# Patient Record
Sex: FEMALE | Race: WHITE | NOT HISPANIC OR LATINO | Employment: OTHER | URBAN - NONMETROPOLITAN AREA
[De-identification: names, ages, dates, MRNs, and addresses within clinical notes are randomized per-mention and may not be internally consistent; named-entity substitution may affect disease eponyms.]

---

## 2019-04-08 ENCOUNTER — OFFICE VISIT (OUTPATIENT)
Dept: SURGERY | Facility: CLINIC | Age: 69
End: 2019-04-08

## 2019-04-08 VITALS
TEMPERATURE: 97.7 F | WEIGHT: 186.8 LBS | OXYGEN SATURATION: 100 % | DIASTOLIC BLOOD PRESSURE: 77 MMHG | SYSTOLIC BLOOD PRESSURE: 124 MMHG | HEIGHT: 63 IN | BODY MASS INDEX: 33.1 KG/M2 | HEART RATE: 96 BPM

## 2019-04-08 DIAGNOSIS — C20 RECTAL CANCER METASTASIZED TO LIVER (HCC): Primary | ICD-10-CM

## 2019-04-08 DIAGNOSIS — C78.7 RECTAL CANCER METASTASIZED TO LIVER (HCC): Primary | ICD-10-CM

## 2019-04-08 PROCEDURE — 99203 OFFICE O/P NEW LOW 30 MIN: CPT | Performed by: SURGERY

## 2019-04-08 RX ORDER — GEMFIBROZIL 600 MG/1
TABLET, FILM COATED ORAL
COMMUNITY
Start: 2019-02-15 | End: 2019-04-08

## 2019-04-08 RX ORDER — GLYBURIDE 5 MG/1
5 TABLET ORAL
COMMUNITY
Start: 2019-02-22

## 2019-04-08 RX ORDER — DRONABINOL 5 MG/1
CAPSULE ORAL
COMMUNITY
Start: 2019-04-05 | End: 2019-04-08

## 2019-04-08 RX ORDER — PRAVASTATIN SODIUM 80 MG/1
80 TABLET ORAL NIGHTLY
COMMUNITY
Start: 2019-02-15

## 2019-04-08 RX ORDER — PROMETHAZINE HYDROCHLORIDE 25 MG/1
25 TABLET ORAL EVERY 6 HOURS PRN
COMMUNITY
Start: 2019-02-22

## 2019-04-08 RX ORDER — LISINOPRIL AND HYDROCHLOROTHIAZIDE 25; 20 MG/1; MG/1
1 TABLET ORAL DAILY
COMMUNITY
Start: 2019-02-26

## 2019-04-08 NOTE — PROGRESS NOTES
Patient: Isa Beauchamp    YOB: 1950    Date: 04/08/2019    Primary Care Provider: Juni Graham DO    Chief Complaint   Patient presents with   • Colon Cancer       SUBJECTIVE:    History of present illness: Patient with a new diagnosis of metastatic rectal cancer.  This was diagnosed about 3-4 weeks ago.  She was having lower abdominal pressure and ultimately had a colonoscopy and was found to have this.  She states that she is quite weak and tired since she is unable to eat much of anything.  She has no appetite.  She has been referred for a port placement.    The following portions of the patient's history were reviewed and updated as appropriate: allergies, current medications, past family history, past medical history, past social history, past surgical history and problem list.       Review of Systems   Constitutional: Negative for chills, fever and unexpected weight change.   HENT: Negative for hearing loss, trouble swallowing and voice change.    Eyes: Negative for visual disturbance.   Respiratory: Negative for apnea, cough, chest tightness, shortness of breath and wheezing.    Cardiovascular: Negative for chest pain, palpitations and leg swelling.   Gastrointestinal: Negative for abdominal distention, abdominal pain, anal bleeding, blood in stool, constipation, diarrhea, nausea, rectal pain and vomiting.   Endocrine: Negative for cold intolerance and heat intolerance.   Genitourinary: Negative for difficulty urinating, dysuria and flank pain.   Musculoskeletal: Negative for back pain and gait problem.   Skin: Negative for color change, rash and wound.   Neurological: Negative for dizziness, syncope, speech difficulty, weakness, light-headedness, numbness and headaches.   Hematological: Negative for adenopathy. Does not bruise/bleed easily.   Psychiatric/Behavioral: Negative for confusion. The patient is not nervous/anxious.        Allergies:  No Known  "Allergies    Medications:    Current Outpatient Medications:   •  glyBURIDE (DIAbeta) 5 MG tablet, , Disp: , Rfl:   •  lisinopril-hydrochlorothiazide (PRINZIDE,ZESTORETIC) 20-25 MG per tablet, , Disp: , Rfl:   •  metFORMIN (GLUCOPHAGE) 1000 MG tablet, , Disp: , Rfl:   •  pravastatin (PRAVACHOL) 80 MG tablet, , Disp: , Rfl:   •  promethazine (PHENERGAN) 25 MG tablet, , Disp: , Rfl:     History:  Past Medical History:   Diagnosis Date   • Cancer (CMS/HCC)    • Diabetes mellitus (CMS/HCC)    • Hypertension        Past Surgical History:   Procedure Laterality Date   • CATARACT EXTRACTION     • CHOLECYSTECTOMY     • KNEE ARTHROPLASTY, PARTIAL REPLACEMENT     • KNEE SURGERY         Family History   Problem Relation Age of Onset   • Cancer Brother        Social History     Tobacco Use   • Smoking status: Never Smoker   • Smokeless tobacco: Never Used   Substance Use Topics   • Alcohol use: No     Frequency: Never   • Drug use: No        OBJECTIVE:    Vital Signs:   Vitals:    04/08/19 1328   BP: 124/77   Pulse: 96   Temp: 97.7 °F (36.5 °C)   SpO2: 100%   Weight: 84.7 kg (186 lb 12.8 oz)   Height: 160 cm (63\")       Physical Exam:   General Appearance:   Alert and oriented.  She does appears somewhat weak.   Head:    Normocephalic, without obvious abnormality, atraumatic   Eyes:            Normal.  No scleral icterus.  PERRLA    Lungs:     Clear to auscultation,respirations regular, even and                  unlabored    Heart:    Regular rhythm and normal rate, normal S1 and S2, no            murmur   Abdomen:     Normal bowel sounds, no masses, no organomegaly, soft        non-tender, non-distended, no guarding,    Extremities:   Moves all extremities well, no edema, no cyanosis, no             redness   Skin:   No bleeding, bruising or rash   Neurologic:   Normal without gross deficits.   Psychiatric: No evidence of depression or anxiety        Results Review:   None reviewed her oncology note    ASSESSMENT/PLAN:    1. " Rectal cancer metastasized to liver (CMS/HCC)        Patient requiring a port for chemotherapy.  She understands the procedure.  She also understands the risks of bleeding, infection as well as pneumothorax and she understands the ramifications of these potential complications and she wishes to proceed.    Electronically signed by Joe Collins MD  04/08/19      .

## 2019-04-09 PROBLEM — C78.7 RECTAL CANCER METASTASIZED TO LIVER (HCC): Status: ACTIVE | Noted: 2019-04-09

## 2019-04-09 PROBLEM — C20 RECTAL CANCER METASTASIZED TO LIVER (HCC): Status: ACTIVE | Noted: 2019-04-09

## 2019-04-09 RX ORDER — GEMFIBROZIL 600 MG/1
600 TABLET, FILM COATED ORAL
COMMUNITY

## 2019-04-09 RX ORDER — ACETAMINOPHEN 500 MG
500 TABLET ORAL EVERY 6 HOURS PRN
COMMUNITY

## 2019-04-10 ENCOUNTER — ANESTHESIA EVENT (OUTPATIENT)
Dept: PERIOP | Facility: HOSPITAL | Age: 69
End: 2019-04-10

## 2019-04-10 ENCOUNTER — HOSPITAL ENCOUNTER (OUTPATIENT)
Facility: HOSPITAL | Age: 69
Setting detail: HOSPITAL OUTPATIENT SURGERY
Discharge: HOME | End: 2019-04-10
Attending: SURGERY | Admitting: SURGERY

## 2019-04-10 ENCOUNTER — APPOINTMENT (OUTPATIENT)
Dept: GENERAL RADIOLOGY | Facility: HOSPITAL | Age: 69
End: 2019-04-10

## 2019-04-10 ENCOUNTER — ANESTHESIA (OUTPATIENT)
Dept: PERIOP | Facility: HOSPITAL | Age: 69
End: 2019-04-10

## 2019-04-10 VITALS
TEMPERATURE: 97.2 F | HEIGHT: 64 IN | OXYGEN SATURATION: 93 % | SYSTOLIC BLOOD PRESSURE: 113 MMHG | RESPIRATION RATE: 18 BRPM | BODY MASS INDEX: 31.76 KG/M2 | DIASTOLIC BLOOD PRESSURE: 61 MMHG | HEART RATE: 67 BPM | WEIGHT: 186 LBS

## 2019-04-10 DIAGNOSIS — C20 RECTAL CANCER METASTASIZED TO LIVER (HCC): ICD-10-CM

## 2019-04-10 DIAGNOSIS — C78.7 RECTAL CANCER METASTASIZED TO LIVER (HCC): ICD-10-CM

## 2019-04-10 LAB — GLUCOSE BLDC GLUCOMTR-MCNC: 129 MG/DL (ref 70–130)

## 2019-04-10 PROCEDURE — C1788 PORT, INDWELLING, IMP: HCPCS | Performed by: SURGERY

## 2019-04-10 PROCEDURE — 76000 FLUOROSCOPY <1 HR PHYS/QHP: CPT

## 2019-04-10 PROCEDURE — 71045 X-RAY EXAM CHEST 1 VIEW: CPT

## 2019-04-10 PROCEDURE — 25010000002 HEPARIN (PORCINE) PER 1000 UNITS: Performed by: SURGERY

## 2019-04-10 PROCEDURE — 25010000002 PROPOFOL 10 MG/ML EMULSION: Performed by: NURSE ANESTHETIST, CERTIFIED REGISTERED

## 2019-04-10 PROCEDURE — 25010000002 PROPOFOL 1000 MG/ML EMULSION: Performed by: NURSE ANESTHETIST, CERTIFIED REGISTERED

## 2019-04-10 PROCEDURE — 82962 GLUCOSE BLOOD TEST: CPT

## 2019-04-10 PROCEDURE — 36561 INSERT TUNNELED CV CATH: CPT | Performed by: SURGERY

## 2019-04-10 PROCEDURE — 25010000003 CEFAZOLIN PER 500 MG: Performed by: SURGERY

## 2019-04-10 DEVICE — POWERPORT CLEARVUE IMPLANTABLE PORT WITH ATTACHABLE 8F POLYURETHANE OPEN-ENDED SINGLE-LUMEN VENOUS CATHETER INTERMEDIATE KIT
Type: IMPLANTABLE DEVICE | Status: FUNCTIONAL
Brand: POWERPORT CLEARVUE

## 2019-04-10 RX ORDER — BUPIVACAINE HYDROCHLORIDE 5 MG/ML
INJECTION, SOLUTION EPIDURAL; INTRACAUDAL AS NEEDED
Status: DISCONTINUED | OUTPATIENT
Start: 2019-04-10 | End: 2019-04-10 | Stop reason: HOSPADM

## 2019-04-10 RX ORDER — LIDOCAINE HYDROCHLORIDE 10 MG/ML
INJECTION, SOLUTION INFILTRATION; PERINEURAL AS NEEDED
Status: DISCONTINUED | OUTPATIENT
Start: 2019-04-10 | End: 2019-04-10 | Stop reason: HOSPADM

## 2019-04-10 RX ORDER — PROPOFOL 10 MG/ML
VIAL (ML) INTRAVENOUS AS NEEDED
Status: DISCONTINUED | OUTPATIENT
Start: 2019-04-10 | End: 2019-04-10 | Stop reason: SURG

## 2019-04-10 RX ORDER — MAGNESIUM HYDROXIDE 1200 MG/15ML
LIQUID ORAL AS NEEDED
Status: DISCONTINUED | OUTPATIENT
Start: 2019-04-10 | End: 2019-04-10 | Stop reason: HOSPADM

## 2019-04-10 RX ORDER — HEPARIN SODIUM 1000 [USP'U]/ML
INJECTION, SOLUTION INTRAVENOUS; SUBCUTANEOUS AS NEEDED
Status: DISCONTINUED | OUTPATIENT
Start: 2019-04-10 | End: 2019-04-10 | Stop reason: HOSPADM

## 2019-04-10 RX ORDER — PROMETHAZINE HYDROCHLORIDE 12.5 MG/1
12.5 TABLET ORAL ONCE AS NEEDED
Status: DISCONTINUED | OUTPATIENT
Start: 2019-04-10 | End: 2019-04-10 | Stop reason: HOSPADM

## 2019-04-10 RX ORDER — KETAMINE HYDROCHLORIDE 50 MG/ML
INJECTION, SOLUTION, CONCENTRATE INTRAMUSCULAR; INTRAVENOUS AS NEEDED
Status: DISCONTINUED | OUTPATIENT
Start: 2019-04-10 | End: 2019-04-10 | Stop reason: SURG

## 2019-04-10 RX ORDER — SODIUM CHLORIDE, SODIUM LACTATE, POTASSIUM CHLORIDE, CALCIUM CHLORIDE 600; 310; 30; 20 MG/100ML; MG/100ML; MG/100ML; MG/100ML
1000 INJECTION, SOLUTION INTRAVENOUS CONTINUOUS
Status: DISCONTINUED | OUTPATIENT
Start: 2019-04-10 | End: 2019-04-10 | Stop reason: HOSPADM

## 2019-04-10 RX ORDER — HYDROCODONE BITARTRATE AND ACETAMINOPHEN 5; 325 MG/1; MG/1
1-2 TABLET ORAL EVERY 4 HOURS PRN
Qty: 6 TABLET | Refills: 0 | Status: SHIPPED | OUTPATIENT
Start: 2019-04-10

## 2019-04-10 RX ORDER — SODIUM CHLORIDE 0.9 % (FLUSH) 0.9 %
3 SYRINGE (ML) INJECTION AS NEEDED
Status: DISCONTINUED | OUTPATIENT
Start: 2019-04-10 | End: 2019-04-10 | Stop reason: HOSPADM

## 2019-04-10 RX ADMIN — PROPOFOL 100 MCG/KG/MIN: 10 INJECTION, EMULSION INTRAVENOUS at 11:43

## 2019-04-10 RX ADMIN — SODIUM CHLORIDE, POTASSIUM CHLORIDE, SODIUM LACTATE AND CALCIUM CHLORIDE 1000 ML: 600; 310; 30; 20 INJECTION, SOLUTION INTRAVENOUS at 10:30

## 2019-04-10 RX ADMIN — KETAMINE HYDROCHLORIDE 10 MG: 50 INJECTION, SOLUTION INTRAMUSCULAR; INTRAVENOUS at 12:03

## 2019-04-10 RX ADMIN — PROPOFOL 100 MG: 10 INJECTION, EMULSION INTRAVENOUS at 11:42

## 2019-04-10 RX ADMIN — KETAMINE HYDROCHLORIDE 10 MG: 50 INJECTION, SOLUTION INTRAMUSCULAR; INTRAVENOUS at 11:48

## 2019-04-10 RX ADMIN — CEFAZOLIN 2 G: 1 INJECTION, POWDER, FOR SOLUTION INTRAVENOUS at 11:36

## 2019-04-10 NOTE — ANESTHESIA POSTPROCEDURE EVALUATION
Patient: Isa Beauchamp    Procedure Summary     Date:  04/10/19 Room / Location:   [facility]    Anesthesia Start:  1136 Anesthesia Stop:  1231    Procedure:  INSERTION OF PORTACATH RIGHT (Right ) Diagnosis:       Rectal cancer metastasized to liver (CMS/HCC)      (Rectal cancer metastasized to liver (CMS/HCC) [C20, C78.7])    Surgeon:  Joe Collins MD Provider:  Adelso Meeks CRNA    Anesthesia Type:  MAC ASA Status:  3          Anesthesia Type: MAC  Last vitals  BP   115/63 (04/10/19 1236)   Temp   97.2 °F (36.2 °C) (04/10/19 1236)   Pulse   64 (04/10/19 1236)   Resp   18 (04/10/19 1236)     SpO2   95 % (04/10/19 1236)     Post Anesthesia Care and Evaluation    Patient location during evaluation: bedside  Patient participation: complete - patient participated  Level of consciousness: awake and alert  Pain management: satisfactory to patient  Airway patency: patent  Anesthetic complications: No anesthetic complications  PONV Status: none  Cardiovascular status: acceptable and hemodynamically stable  Respiratory status: acceptable  Hydration status: acceptable

## 2019-04-10 NOTE — ANESTHESIA PREPROCEDURE EVALUATION
Anesthesia Evaluation     Patient summary reviewed and Nursing notes reviewed   no history of anesthetic complications:  NPO Solid Status: > 8 hours  NPO Liquid Status: > 8 hours           Airway   Mallampati: II  TM distance: >3 FB  Neck ROM: full  no difficulty expected  Dental - normal exam     Pulmonary - negative pulmonary ROS and normal exam   Cardiovascular - normal exam    Rhythm: regular  Rate: normal    (+) hypertension 2 medications or greater,       Neuro/Psych- negative ROS  GI/Hepatic/Renal/Endo    (+)   liver disease, diabetes mellitus type 2,     Musculoskeletal (-) negative ROS    Abdominal    Substance History - negative use     OB/GYN negative ob/gyn ROS         Other      history of cancer active    ROS/Med Hx Other: Colon, rectal, liver, and lymph node CA                  Anesthesia Plan    ASA 3     MAC   (Pt told that intravenous sedation will be used as the primary anesthetic along with local anesthesia if necessary. Every effort will be made to make sure the patient is comfortable.     The patient was told they may or may not have recall for the procedure. It was further explained that if the MAC was not adequate that a general anesthetic with either an LMA or endotracheal tube would be required.     Will proceed with the plan of care.)  intravenous induction   Anesthetic plan, all risks, benefits, and alternatives have been provided, discussed and informed consent has been obtained with: patient.

## 2019-04-10 NOTE — OP NOTE
PATIENT:    Isa Beauchamp    DATE OF SURGERY:  4/10/2019    PHYSICIAN:    Joe Collins MD    REFERRING PHYSICIAN:  Joe Collins MD    YOB: 1950    PREOPERATIVE DIAGNOSIS:  need for chemotherapy. Rectal cancer metastasized to liver (CMS/HCC) [C20, C78.7]    POSTOPERATIVE DIAGNOSIS:  need for chemotherapy. Post-Op Diagnosis Codes:     * Rectal cancer metastasized to liver (CMS/HCC) [C20, C78.7]    PROCEDURE:  right internal jugular Port-A-Cath placement    OPERATIVE PROCEDURE:  The patient was taken to the operating room in the normal manner.  I did speak with the patient today and marked them accordingly.  An appropriate timeout was performed by the nursing staff intraoperatively prior to the incision.  Preoperative IV antibiotics were given.  The patient was prepped and draped in a normal sterile fashion after being placed in the supine position.     Using the Ultrasound, the right IJ vein was entered with a large bore needle after the use of Lidocaine for anesthesia.  The wire was advanced through the needle under flouroscopy appropriately positioned. A pocket was created in the upper chest wall.  Introducer sheath was then advanced over the guidewire and then the catheter advanced through the sheath into the superior vena cava.  The catheter was then tunneled to the pocket and connected to the port itself.    Flouroscopy was then used to confirm that the catheter was in good position.  3-0 vicryl was used to close the wound and then a running 4-0 pds was used to close the skin.  Steri strips were placed without difficulty.  The port was accessed via Hubner needle and flushed with heparanized saline, final flush was performed without difficulty. Port aspirated easily as well.     The patient was stable at this point in time and transferred to the recovery room in stable condition where CXR will be obtained.    Joe Collins MD    4/10/2019    12:31 PM

## 2019-04-22 ENCOUNTER — OFFICE VISIT (OUTPATIENT)
Dept: SURGERY | Facility: CLINIC | Age: 69
End: 2019-04-22

## 2019-04-22 VITALS
HEART RATE: 43 BPM | HEIGHT: 64 IN | DIASTOLIC BLOOD PRESSURE: 85 MMHG | BODY MASS INDEX: 30.7 KG/M2 | OXYGEN SATURATION: 94 % | SYSTOLIC BLOOD PRESSURE: 119 MMHG | WEIGHT: 179.8 LBS | TEMPERATURE: 98.2 F

## 2019-04-22 DIAGNOSIS — Z48.89 POSTOPERATIVE VISIT: Primary | ICD-10-CM

## 2019-04-22 PROCEDURE — 99024 POSTOP FOLLOW-UP VISIT: CPT | Performed by: SURGERY

## 2019-04-22 NOTE — PROGRESS NOTES
"Patient: Isa Beauchamp    YOB: 1950    Date: 04/22/2019    Primary Care Provider: Juni Graham DO    Chief Complaint   Patient presents with   • Post-op     port placement       History: The patient is in the office today for a post op visit after her recent port-a-cath placement.  The port has been used for chemotherapy and blood draws and is working great.  The patient denies any problems since the procedure.    The following portions of the patient's history were reviewed and updated as appropriate: allergies, current medications, past family history, past medical history, past social history, past surgical history and problem list.      Review of Systems   Constitutional: Negative for chills, fatigue and fever.   Respiratory: Negative for cough.    Cardiovascular: Negative for chest pain.   Gastrointestinal: Negative for abdominal pain, diarrhea, nausea and vomiting.       Vital Signs  Vitals:    04/22/19 1256   BP: 119/85   Pulse: (!) 43   Temp: 98.2 °F (36.8 °C)   TempSrc: Temporal   SpO2: 94%   Weight: 81.6 kg (179 lb 12.8 oz)   Height: 162.6 cm (64.02\")       Allergies:  No Known Allergies    Medications:    Current Outpatient Medications:   •  acetaminophen (TYLENOL) 500 MG tablet, Take 500 mg by mouth Every 6 (Six) Hours As Needed for Mild Pain ., Disp: , Rfl:   •  gemfibrozil (LOPID) 600 MG tablet, Take 600 mg by mouth 2 (Two) Times a Day Before Meals., Disp: , Rfl:   •  glyBURIDE (DIAbeta) 5 MG tablet, Take 5 mg by mouth Daily With Breakfast., Disp: , Rfl:   •  HYDROcodone-acetaminophen (NORCO) 5-325 MG per tablet, Take 1-2 tablets by mouth Every 4 (Four) Hours As Needed for Moderate Pain, Disp: 6 tablet, Rfl: 0  •  lisinopril-hydrochlorothiazide (PRINZIDE,ZESTORETIC) 20-25 MG per tablet, Take 1 tablet by mouth Daily., Disp: , Rfl:   •  metFORMIN (GLUCOPHAGE) 1000 MG tablet, Take 1,000 mg by mouth 2 (Two) Times a Day With Meals., Disp: , Rfl:   •  pravastatin (PRAVACHOL) 80 MG " tablet, Take 80 mg by mouth Every Night., Disp: , Rfl:   •  promethazine (PHENERGAN) 25 MG tablet, Take 25 mg by mouth Every 6 (Six) Hours As Needed., Disp: , Rfl:     Physical Exam:   General Appearance:    Alert, cooperative, in no acute distress.  Wound site was examined and revealed no evidence of infection and otherwise healing well.     Results Review:   None     ASSESSMENT/PLAN:    1. Postoperative visit      Doing well and no issues after Port-A-Cath placement.  Follow-up as needed.    Electronically signed by Joe Collins MD  04/22/19    Portions of this note have been scribed for Joe Collins MD by Michelle Milligan. 4/22/2019  1:30 PM
